# Patient Record
Sex: FEMALE | Race: WHITE
[De-identification: names, ages, dates, MRNs, and addresses within clinical notes are randomized per-mention and may not be internally consistent; named-entity substitution may affect disease eponyms.]

---

## 2017-10-09 ENCOUNTER — HOSPITAL ENCOUNTER (EMERGENCY)
Dept: HOSPITAL 57 - BURERS | Age: 57
Discharge: HOME | End: 2017-10-09
Payer: SELF-PAY

## 2017-10-09 DIAGNOSIS — F32.9: ICD-10-CM

## 2017-10-09 DIAGNOSIS — F17.210: ICD-10-CM

## 2017-10-09 DIAGNOSIS — F41.9: Primary | ICD-10-CM

## 2017-10-09 DIAGNOSIS — E03.9: ICD-10-CM

## 2017-10-09 DIAGNOSIS — L50.9: ICD-10-CM

## 2017-10-09 PROCEDURE — 96372 THER/PROPH/DIAG INJ SC/IM: CPT

## 2019-09-09 ENCOUNTER — HOSPITAL ENCOUNTER (OUTPATIENT)
Dept: HOSPITAL 92 - BICMAMMO | Age: 59
Discharge: HOME | End: 2019-09-09
Attending: CLINICAL NURSE SPECIALIST
Payer: MEDICARE

## 2019-09-09 DIAGNOSIS — Z80.3: ICD-10-CM

## 2019-09-09 DIAGNOSIS — Z12.31: Primary | ICD-10-CM

## 2019-09-09 PROCEDURE — 77067 SCR MAMMO BI INCL CAD: CPT

## 2019-09-09 PROCEDURE — 77063 BREAST TOMOSYNTHESIS BI: CPT

## 2019-09-09 NOTE — MMO
Bilateral MAMMO Bilat Screen [facility].

 

CLINICAL HISTORY:

Patient is 58 years old and is seen for screening. The patient has the following

family history of breast cancer:  mother, at age 68, malignant (generic).  The

patient has a history of thyroid cancer at age 53.

 

VIEWS:

The views performed were:  bilateral craniocaudal with tomosynthesis and

bilateral mediolateral oblique with tomosynthesis.

 

MAMMOGRAM FINDINGS:

There are scattered fibroglandular densities.

 

There are no suspicious masses, suspicious calcifications, or new areas of

architectural distortion.

 

IMPRESSION:

THERE IS NO MAMMOGRAPHIC EVIDENCE OF MALIGNANCY.

 

A ROUTINE FOLLOW-UP MAMMOGRAM IN 1 YEAR IS RECOMMENDED.

 

THE RESULTS OF THIS EXAM WERE SENT TO THE PATIENT.

 

ACR BI-RADS Category 1 - Negative

 

MAMMOGRAPHY NOTE:

 1. A negative mammogram report should not delay a biopsy if a dominant of

 clinically suspicious mass is present.

 2. Approximately 10% to 15% of breast cancers are not detected by

 mammography.

 3. Adenosis and dense breasts may obscure an underlying neoplasm.

 

 

Reported by: JANICE NEVES MD

Electonically Signed: [id number]